# Patient Record
Sex: FEMALE | Race: BLACK OR AFRICAN AMERICAN | Employment: FULL TIME | URBAN - METROPOLITAN AREA
[De-identification: names, ages, dates, MRNs, and addresses within clinical notes are randomized per-mention and may not be internally consistent; named-entity substitution may affect disease eponyms.]

---

## 2018-01-02 ENCOUNTER — LAB ENCOUNTER (OUTPATIENT)
Dept: LAB | Facility: HOSPITAL | Age: 23
End: 2018-01-02
Attending: OTOLARYNGOLOGY
Payer: COMMERCIAL

## 2018-01-02 ENCOUNTER — HOSPITAL ENCOUNTER (OUTPATIENT)
Dept: ULTRASOUND IMAGING | Facility: HOSPITAL | Age: 23
Discharge: HOME | End: 2018-01-02
Attending: OTOLARYNGOLOGY
Payer: COMMERCIAL

## 2018-01-02 DIAGNOSIS — E07.9 THYROID DISORDER: ICD-10-CM

## 2018-01-02 DIAGNOSIS — E04.1 THYROID NODULE: ICD-10-CM

## 2018-01-02 LAB
FREE T4: 0.8 NG/DL (ref 0.9–1.8)
TSI SER-ACNC: 1.29 MIU/ML (ref 0.35–5.5)

## 2018-01-02 PROCEDURE — 76536 US EXAM OF HEAD AND NECK: CPT | Performed by: OTOLARYNGOLOGY

## 2018-01-02 PROCEDURE — 84443 ASSAY THYROID STIM HORMONE: CPT

## 2018-01-02 PROCEDURE — 84439 ASSAY OF FREE THYROXINE: CPT

## 2018-01-02 PROCEDURE — 36415 COLL VENOUS BLD VENIPUNCTURE: CPT

## 2020-07-29 ENCOUNTER — HOSPITAL ENCOUNTER (OUTPATIENT)
Age: 25
Discharge: HOME | End: 2020-07-29
Payer: COMMERCIAL

## 2020-07-29 VITALS
RESPIRATION RATE: 16 BRPM | SYSTOLIC BLOOD PRESSURE: 111 MMHG | OXYGEN SATURATION: 100 % | HEART RATE: 67 BPM | DIASTOLIC BLOOD PRESSURE: 72 MMHG | TEMPERATURE: 97 F

## 2020-07-29 DIAGNOSIS — T19.2XXA FOREIGN BODY IN VULVA AND VAGINA, INITIAL ENCOUNTER: Primary | ICD-10-CM

## 2020-07-29 PROCEDURE — 99202 OFFICE O/P NEW SF 15 MIN: CPT | Performed by: NURSE PRACTITIONER

## 2020-07-29 NOTE — ED INITIAL ASSESSMENT (HPI)
Pt sts tampon unravelled as was removing this morning and some of it may still be in vagina. Unable to remove per self.

## 2020-07-29 NOTE — ED PROVIDER NOTES
Patient Seen in: [address] [facility] [place]      History   Patient presents with:  Eval-G    Stated Complaint: gyne issue-tampon stuck    HPI  Patient is 27-year-old female without significant medical history presents with possible retained tampon i Mouth/Throat:      Mouth: Mucous membranes are moist.   Eyes:      Conjunctiva/sclera: Conjunctivae normal.   Pulmonary:      Effort: Pulmonary effort is normal.      Breath sounds: No rales.    Abdominal:      General: Bowel sounds are normal. There is no

## 2021-12-25 ENCOUNTER — HOSPITAL ENCOUNTER (OUTPATIENT)
Age: 26
Discharge: HOME | End: 2021-12-25
Payer: COMMERCIAL

## 2021-12-25 VITALS
BODY MASS INDEX: 30.62 KG/M2 | RESPIRATION RATE: 16 BRPM | HEART RATE: 86 BPM | HEIGHT: 63.5 IN | WEIGHT: 175 LBS | SYSTOLIC BLOOD PRESSURE: 132 MMHG | TEMPERATURE: 98 F | OXYGEN SATURATION: 100 % | DIASTOLIC BLOOD PRESSURE: 84 MMHG

## 2021-12-25 DIAGNOSIS — U07.1 COVID-19 VIRUS INFECTION: Primary | ICD-10-CM

## 2021-12-25 PROCEDURE — U0002 COVID-19 LAB TEST NON-CDC: HCPCS | Performed by: NURSE PRACTITIONER

## 2021-12-25 PROCEDURE — 99212 OFFICE O/P EST SF 10 MIN: CPT | Performed by: NURSE PRACTITIONER

## 2021-12-25 NOTE — ED PROVIDER NOTES
Patient Seen in: [facility]      History   Patient presents with:   Body ache and/or chills  Headache  Cough  Sore Throat    Stated Complaint: bodyaches, headache, chest tightness, cough, sore throat    Subjective:   HPI  32-year-old female who All other components within normal limits                   MDM    Covid positive. Symptomatic treatment discussed with patient as well as ER precautions and quarantine.                                Disposition and Plan     Clinical Impression:  COVID

## 2021-12-26 ENCOUNTER — TELEPHONE (OUTPATIENT)
Dept: FAMILY MEDICINE CLINIC | Facility: CLINIC | Age: 26
End: 2021-12-26

## 2021-12-27 NOTE — TELEPHONE ENCOUNTER
Positive for COVID. HAS not seen me since 2016. Lives in [place]. Place on COVID call list and see if will re-establish care?

## 2021-12-29 ENCOUNTER — HOSPITAL ENCOUNTER (OUTPATIENT)
Age: 26
Discharge: HOME | End: 2021-12-29
Attending: FAMILY MEDICINE
Payer: COMMERCIAL

## 2021-12-29 ENCOUNTER — APPOINTMENT (OUTPATIENT)
Dept: GENERAL RADIOLOGY | Age: 26
End: 2021-12-29
Attending: FAMILY MEDICINE
Payer: COMMERCIAL

## 2021-12-29 VITALS
BODY MASS INDEX: 30.62 KG/M2 | TEMPERATURE: 98 F | OXYGEN SATURATION: 99 % | RESPIRATION RATE: 18 BRPM | WEIGHT: 175 LBS | HEIGHT: 63.5 IN | HEART RATE: 72 BPM

## 2021-12-29 DIAGNOSIS — R07.9 CHEST PAIN OF UNCERTAIN ETIOLOGY: Primary | ICD-10-CM

## 2021-12-29 DIAGNOSIS — U07.1 COVID-19: ICD-10-CM

## 2021-12-29 LAB
ATRIAL RATE: 64 BPM
P AXIS: 56 DEGREES
P-R INTERVAL: 170 MS
Q-T INTERVAL: 416 MS
QRS DURATION: 70 MS
QTC CALCULATION (BEZET): 429 MS
R AXIS: 49 DEGREES
T AXIS: 45 DEGREES
VENTRICULAR RATE: 64 BPM

## 2021-12-29 PROCEDURE — 71046 X-RAY EXAM CHEST 2 VIEWS: CPT | Performed by: FAMILY MEDICINE

## 2021-12-29 PROCEDURE — 99203 OFFICE O/P NEW LOW 30 MIN: CPT | Performed by: FAMILY MEDICINE

## 2021-12-29 PROCEDURE — 93000 ELECTROCARDIOGRAM COMPLETE: CPT | Performed by: FAMILY MEDICINE

## 2021-12-29 NOTE — TELEPHONE ENCOUNTER
Left another message to call us back for condition update, find out if she still wants to be patient here and book appointment if that is the case.

## 2021-12-29 NOTE — ED PROVIDER NOTES
Patient Seen in: [facility] [address] [facility]      History   No chief complaint on file. Stated Complaint: COVID + cough,chest pain    Subjective:   Started feeling last Wednesday. Initially headache, then sore throat, then CP when breathing.   COVID test pos gallop. Pulmonary:      Effort: Pulmonary effort is normal. No respiratory distress. Breath sounds: Normal breath sounds. No wheezing or rales. EK bpm, NSR, no ischemic changes, normal axis.         ED Course   Labs Reviewed - No data to [facility]

## 2022-01-20 ENCOUNTER — APPOINTMENT (OUTPATIENT)
Dept: ULTRASOUND IMAGING | Facility: HOSPITAL | Age: 27
End: 2022-01-20
Payer: COMMERCIAL

## 2022-01-20 ENCOUNTER — OUTPATIENT (OUTPATIENT)
Dept: OUTPATIENT SERVICES | Facility: HOSPITAL | Age: 27
LOS: 1 days | End: 2022-01-20
Payer: COMMERCIAL

## 2022-01-20 PROCEDURE — 76700 US EXAM ABDOM COMPLETE: CPT | Mod: 26

## 2022-01-20 PROCEDURE — 76700 US EXAM ABDOM COMPLETE: CPT

## 2024-04-18 ENCOUNTER — APPOINTMENT (OUTPATIENT)
Dept: ORTHOPEDIC SURGERY | Facility: CLINIC | Age: 29
End: 2024-04-18
Payer: COMMERCIAL

## 2024-04-18 VITALS — BODY MASS INDEX: 33.25 KG/M2 | WEIGHT: 190 LBS | HEIGHT: 63.5 IN

## 2024-04-18 DIAGNOSIS — M25.372 OTHER INSTABILITY, LEFT ANKLE: ICD-10-CM

## 2024-04-18 PROBLEM — Z00.00 ENCOUNTER FOR PREVENTIVE HEALTH EXAMINATION: Status: ACTIVE | Noted: 2024-04-18

## 2024-04-18 PROCEDURE — 99203 OFFICE O/P NEW LOW 30 MIN: CPT

## 2024-04-18 RX ORDER — NABUMETONE 500 MG/1
500 TABLET, FILM COATED ORAL
Qty: 60 | Refills: 2 | Status: ACTIVE | COMMUNITY
Start: 2024-04-18 | End: 1900-01-01

## 2024-04-18 NOTE — HISTORY OF PRESENT ILLNESS
[de-identified] : Initial Visit: Left ankle  Reason: sprained 11/2023 original sprain  Duration:1 month got worse  Prior studies: Mri @ [facility] Surgical Hx: none  Medical Hx: none  Aggravating Fx: walking / standing  Alleviating fX: nothing helps / cam boot  Pain:9/10 Pain Med: anti inflammatory - Meloxicam  Current med: Allergies: Clindamycin  In physical therapy - not much improvement

## 2024-04-18 NOTE — PHYSICAL EXAM
[de-identified] : Left ankle:  Constitutional:  The patient is healthy-appearing and in no apparent distress.   Gait and Station:  The patient ambulates with a normal gait and no limp.   Cardiovascular System:  The capillary refill is less than 2 seconds.   Skin:  There are no skin abnormalities of ankle.  Ankles and Feet:  Inspection:  There is no erythema. There is no induration. There is no warmth. There is no deformity.   There is swelling (anterolateral).   Bony Palpation:  There is no tenderness of the calcaneal tuberosity. There is no tenderness of the metatarsals. There is no tenderness of the tarsometatarsal joints There is no tenderness of the navicular tuberosity.  There is no tenderness of the dome of talus. There is no tenderness of the head of talus. There is no tenderness of the inferior tibiofibular joint.  Soft Tissue Palpation:  There is no tenderness of the tibialis posterior. There is no tenderness of the tibialis anterior.  There is no tenderness of the plantar fascia. There is no tenderness of the Achilles tendon. There is no tenderness of the extensor hallucis longus. There is no tenderness of the sinus tarsi.  There is no tenderness of the peroneus longus and brevis. There is no tenderness of the deltoid ligament.    There is tenderness of the anterior talofibular ligament and the calcaneofibular ligament.   Active Range of Motion:  The range of motion at the ankle is full.   Stability:  The anterior drawer is 3+.       [de-identified] : MRI of left ankle from [facility] radiology: There is a complete tear of the ATFL and a small 2 x 2 millimeter osteochondral defect

## 2024-04-18 NOTE — ASSESSMENT
[FreeTextEntry1] : Reviewed at length with patient underlying instability and treatment options and recommendation is time for formal physical therapy with lateral ankle stabilization and peroneal strengthening if nonoperative measures fail to provide relief of symptoms consideration ultimately to surgical stabilization

## 2024-09-18 ENCOUNTER — NON-APPOINTMENT (OUTPATIENT)
Age: 29
End: 2024-09-18

## 2024-09-19 ENCOUNTER — APPOINTMENT (OUTPATIENT)
Dept: UROGYNECOLOGY | Facility: CLINIC | Age: 29
End: 2024-09-19
Payer: COMMERCIAL

## 2024-09-19 VITALS
WEIGHT: 199 LBS | HEART RATE: 81 BPM | SYSTOLIC BLOOD PRESSURE: 141 MMHG | DIASTOLIC BLOOD PRESSURE: 96 MMHG | OXYGEN SATURATION: 99 % | BODY MASS INDEX: 34.7 KG/M2

## 2024-09-19 DIAGNOSIS — N32.81 OVERACTIVE BLADDER: ICD-10-CM

## 2024-09-19 DIAGNOSIS — R10.2 PELVIC AND PERINEAL PAIN: ICD-10-CM

## 2024-09-19 DIAGNOSIS — F12.90 CANNABIS USE, UNSPECIFIED, UNCOMPLICATED: ICD-10-CM

## 2024-09-19 PROCEDURE — 99459 PELVIC EXAMINATION: CPT

## 2024-09-19 PROCEDURE — 99203 OFFICE O/P NEW LOW 30 MIN: CPT

## 2024-09-19 RX ORDER — NORETHINDRONE ACETATE AND ETHINYL ESTRADIOL 1; 20 MG/1; UG/1
1-20 TABLET ORAL DAILY
Qty: 3 | Refills: 4 | Status: ACTIVE | COMMUNITY
Start: 2024-09-19 | End: 1900-01-01

## 2024-09-19 NOTE — ASSESSMENT
[FreeTextEntry1] : 29 G0 pelvic pain  ref for sonogram pelvic  start loestrin  ua hcg and u rine  nl no evidence of  uti  ret after sonogram

## 2024-09-19 NOTE — OB HISTORY
[Total Preg ___] : : [unfilled] [Living ___] : [unfilled] (living) [Definite ___ (Date)] : the last menstrual period was [unfilled] [Last Pap Smear ___] : date of last pap smear was on [unfilled] [Sexually Active] : sexually active

## 2024-09-19 NOTE — DISCUSSION/SUMMARY
[Reviewed Clinical Lab Test(s)] : Results of clinical tests were reviewed. [Reviewed Radiology Report(s)] : Radiology reports were reviewed. [Discuss Alternatives/Risks/Benefits w/Patient] : All alternatives, risks, and benefits were discussed with the patient/family and all questions were answered.  Patient expressed good understanding and appreciates the importance of follow up as recommended. [Visit Time ___ Minutes] : [unfilled] minutes [Face to Face Time___ Minutes] : with [unfilled] minutes in face to face consultation.

## 2024-09-19 NOTE — PHYSICAL EXAM
[Chaperone Present] : A chaperone was present in the examining room during all aspects of the physical examination [27274] : A chaperone was present during the pelvic exam. [Well developed] : well developed [Well Nourished] : ~L well nourished [Good Hygeine] : demonstrates good hygeine [Oriented x3] : oriented to person, place, and time [Normal Memory] : ~T memory was ~L unimpaired [Normal Mood/Affect] : mood and affect are normal [Normal sensory exam] : sensory exam was normal [Normal Lung Sounds] : the lungs were clear to auscultation [Respirations regular] : ~T respiratory rate was regular [No Lesions] : no lesions were seen on the external genitalia [Labia Minora] : were normal [Normal Appearance] : general appearance was normal [Normal] : no abnormalities [Anxiety] : patient is not anxious [No Acute Distress] : in acute distress [Cough] : no cough [Dyspnea] : no dyspnea [Tenderness] : ~T no ~M abdominal tenderness observed [Distended] : not distended [H/Smegaly] : no hepatosplenomegaly [Hernia] : no hernia observed [Scar] : no scars [de-identified] : na

## 2024-09-19 NOTE — HISTORY OF PRESENT ILLNESS
[FreeTextEntry1] : Kylee Sorensen 29yoP0 presents for 2nd opinion reoccurring ovarian cysts. Patient states she recently had a ruptured ovarian cyst in 7/24/2024 along with a small fibroid. No surgery was needed because the cyst had already ruptured before getting to the hospital. Patient was given pain medication and was told to rest. Patient states she was given antibiotics Ciprofloxcin hcl 500mg bid x 7 days for possible UTI two weeks ago by another provider and just finish the dose two days ago. pt wentto ED  in July  at [place]   was told she  had a rupyred cyst   ct scan performed  unable to see the appendix  pt said she followed up  [facility]  [facility]  told she had  small fibroid and unclear if she had cyst  pt declined  pain with menses  and defecation pain started in juily reports dysuri and  post coital pain denies AUB . currently  on otc ocp prev on loestrin desires that reports  urinary  frquency  vods 10 x per day  no uui  or chip

## 2024-09-19 NOTE — REASON FOR VISIT
[Initial Visit ___] : an initial visit for [unfilled] [Other: _____] : [unfilled] [Pelvic Pain] : pelvic pain [Questionnaire Received] : Patient questionnaire received [Intake Form Reviewed] : Patient intake form with past medical history, surgical history, family history and social history reviewed today [Urine Frequency] : urine frequency

## 2024-09-23 ENCOUNTER — NON-APPOINTMENT (OUTPATIENT)
Age: 29
End: 2024-09-23

## 2024-09-23 LAB — BACTERIA UR CULT: NORMAL
